# Patient Record
Sex: FEMALE | Race: WHITE | NOT HISPANIC OR LATINO | URBAN - METROPOLITAN AREA
[De-identification: names, ages, dates, MRNs, and addresses within clinical notes are randomized per-mention and may not be internally consistent; named-entity substitution may affect disease eponyms.]

---

## 2023-04-19 LAB — HBA1C MFR BLD: 5.3 % (ref 4–6)

## 2023-06-13 PROBLEM — E66.811 CLASS 1 OBESITY DUE TO EXCESS CALORIES WITH SERIOUS COMORBIDITY AND BODY MASS INDEX (BMI) OF 32.0 TO 32.9 IN ADULT: Chronic | Status: ACTIVE | Noted: 2023-06-13

## 2023-09-05 LAB
HUMAN PAPILLOMAVIRUS (HPV): NORMAL
PAP RECOMMENDATION EXT: NORMAL
PAP SMEAR: NORMAL

## 2024-07-18 ENCOUNTER — TELEPHONE (OUTPATIENT)
Dept: INTERNAL MEDICINE | Facility: CLINIC | Age: 54
End: 2024-07-18
Payer: COMMERCIAL

## 2024-07-18 DIAGNOSIS — N28.89 OTHER SPECIFIED DISORDERS OF KIDNEY AND URETER: Primary | ICD-10-CM

## 2024-07-18 NOTE — TELEPHONE ENCOUNTER
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 Insurance will cover a CT Abd W/WO contrast. Okay to order CT abd W/WO Contrast no pelvis.  ----- Message -----  From: Nicol Snow  Sent: 7/18/2024  10:53 AM CDT  To: Aleks Foster Staff  Subject: add'l clinicals needed                           [employer] is requesting additional clinicals for ct abdomen/pelvis. They are stating that they would approve cpt code [id number] ct abdomen with and without contrast. If doctor wants this study, please fax new order and let us know so we can [facility]. Request has been scanned into media manager.    Thanks,    Nicol nSow  [facility]

## 2024-07-24 ENCOUNTER — TELEPHONE (OUTPATIENT)
Dept: INTERNAL MEDICINE | Facility: CLINIC | Age: 54
End: 2024-07-24
Payer: COMMERCIAL

## 2024-07-24 DIAGNOSIS — R91.1 SOLITARY PULMONARY NODULE: Primary | ICD-10-CM

## 2024-07-24 NOTE — TELEPHONE ENCOUNTER
----- Message from Cristian Lizama MD sent at 7/24/2024  9:03 AM CDT -----  Patient needs dedicated CT of the chest WO - 4mm nodule noted in the right middle lobe  No concerning findings otherwise in the abdomen or pelvis

## 2024-08-29 ENCOUNTER — PATIENT OUTREACH (OUTPATIENT)
Facility: CLINIC | Age: 54
End: 2024-08-29
Payer: COMMERCIAL

## 2024-08-29 NOTE — PROGRESS NOTES
Population Health Outreach. Health Maintenance Topic(s) Outreach Outcomes & Actions Taken:    Breast Cancer Screening - Outreach Outcomes & Actions Taken  : External Records Requested & Care Team Updated if Applicable    Cervical Cancer Screening - Outreach Outcomes & Actions Taken  : External Records Uploaded & Care Team Updated if Applicable    Lab(s) - Outreach Outcomes & Actions Taken  : External Records Uploaded & Care Team Updated if Applicable and Primary Care Follow Up Visit Scheduled         Additional Notes:  Next PCP visit: 12/26/24    Hyperlinked PAP/HPV 09.04.23 Screening     [facility] OB [facility]YN ordered MMG@ 09/2023 appointment-  record request sent to [facility]     Hyperlinked 04.19.23 A1C          Care Management, Digital Medicine, and/or Education Referrals      Next Steps - Referral Actions: Digital Medicine Outcomes and Actions Taken: Pt Declined or Not Eligible

## 2024-08-29 NOTE — LETTER
AUTHORIZATION FOR RELEASE OF   CONFIDENTIAL INFORMATION    Dear RICKI,    We are seeing Monserrat Walker, date of birth 1970, in the clinic at [place] [address] INTERNAL MEDICINE. Cristian Lizama MD is the patient's PCP. Monserrat Walker has an outstanding lab/procedure at the time we reviewed her chart. In order to help keep her health information updated, she has authorized us to request the following medical record(s):        ( X )  MAMMOGRAM                                            Please fax records to [facility], [place], Tyler M, MD, [phone]       If you have any questions, please contact Mayra at [phone]          Patient Name: Monserrat Walker  : 1970  Patient Phone #: [phone]

## 2024-09-04 ENCOUNTER — OFFICE VISIT (OUTPATIENT)
Dept: INTERNAL MEDICINE | Facility: CLINIC | Age: 54
End: 2024-09-04
Payer: COMMERCIAL

## 2024-09-04 VITALS
TEMPERATURE: 98 F | SYSTOLIC BLOOD PRESSURE: 110 MMHG | RESPIRATION RATE: 16 BRPM | HEART RATE: 77 BPM | DIASTOLIC BLOOD PRESSURE: 80 MMHG | BODY MASS INDEX: 29.81 KG/M2 | OXYGEN SATURATION: 99 % | HEIGHT: 62 IN | WEIGHT: 162 LBS

## 2024-09-04 DIAGNOSIS — R05.8 PRODUCTIVE COUGH: Primary | ICD-10-CM

## 2024-09-04 DIAGNOSIS — J01.00 ACUTE NON-RECURRENT MAXILLARY SINUSITIS: ICD-10-CM

## 2024-09-04 PROCEDURE — 3079F DIAST BP 80-89 MM HG: CPT | Mod: CPTII,,, | Performed by: NURSE PRACTITIONER

## 2024-09-04 PROCEDURE — 3066F NEPHROPATHY DOC TX: CPT | Mod: CPTII,,, | Performed by: NURSE PRACTITIONER

## 2024-09-04 PROCEDURE — 1159F MED LIST DOCD IN RCRD: CPT | Mod: CPTII,,, | Performed by: NURSE PRACTITIONER

## 2024-09-04 PROCEDURE — 3061F NEG MICROALBUMINURIA REV: CPT | Mod: CPTII,,, | Performed by: NURSE PRACTITIONER

## 2024-09-04 PROCEDURE — 99214 OFFICE O/P EST MOD 30 MIN: CPT | Mod: ,,, | Performed by: NURSE PRACTITIONER

## 2024-09-04 PROCEDURE — 3074F SYST BP LT 130 MM HG: CPT | Mod: CPTII,,, | Performed by: NURSE PRACTITIONER

## 2024-09-04 PROCEDURE — 1160F RVW MEDS BY RX/DR IN RCRD: CPT | Mod: CPTII,,, | Performed by: NURSE PRACTITIONER

## 2024-09-04 PROCEDURE — 3008F BODY MASS INDEX DOCD: CPT | Mod: CPTII,,, | Performed by: NURSE PRACTITIONER

## 2024-09-04 RX ORDER — AMOXICILLIN AND CLAVULANATE POTASSIUM 875; 125 MG/1; MG/1
1 TABLET, FILM COATED ORAL EVERY 12 HOURS
Qty: 14 TABLET | Refills: 0 | Status: SHIPPED | OUTPATIENT
Start: 2024-09-04 | End: 2024-09-11

## 2024-09-04 RX ORDER — CETIRIZINE HYDROCHLORIDE 10 MG/1
10 TABLET ORAL DAILY
COMMUNITY

## 2024-09-04 RX ORDER — METHYLPREDNISOLONE 4 MG/1
TABLET ORAL
Qty: 21 EACH | Refills: 0 | Status: SHIPPED | OUTPATIENT
Start: 2024-09-04

## 2024-09-04 RX ORDER — ATENOLOL AND CHLORTHALIDONE TABLET 50; 25 MG/1; MG/1
1 TABLET ORAL DAILY
COMMUNITY

## 2024-09-04 NOTE — PROGRESS NOTES
Internal Medicine    Patient ID: [id number]     Chief Complaint: Cough (The past month), Nasal Congestion, and Headache      HPI:     Monserrat Walker is a 54 y.o. female here today for a problem visit. Complaining of chest congestion, nasal blockage, post nasal drip, productive cough, sinus and nasal congestion, and sore throat  x 1 month or so. Bilateral maxillary pressure. Tested positive for Covid about 2 months ago. Switched antihistamines without improvement of symptoms. Denies fever, body aches, chills, SOB, difficulty breathing, or CP. Admits to no known sick contacts.  Reports getting a celestone shot last week at work. No other complaints today.     Past Medical History:   Diagnosis Date    Attention deficit hyperactivity disorder (ADHD) 2023    Biliary dyskinesia 2023    Class 1 obesity due to excess calories with serious comorbidity and body mass index (BMI) of 32.0 to 32.9 in adult 2023    Generalized anxiety disorder 2023    Primary hypertension 2023    Raynaud disease 2023    Stage 3a chronic kidney disease 2023        Past Surgical History:   Procedure Laterality Date     SECTION      CHOLECYSTECTOMY          Social History     Tobacco Use    Smoking status: Never    Smokeless tobacco: Never   Substance and Sexual Activity    Alcohol use: Yes    Drug use: Never    Sexual activity: Yes        Current Outpatient Medications   Medication Instructions    amoxicillin-clavulanate 875-125mg (AUGMENTIN) 875-125 mg per tablet 1 tablet, Oral, Every 12 hours    atenoloL-chlorthalidone (TENORETIC) 50-25 mg Tab 1 tablet, Oral, Daily, Take 1/2 tablet    cetirizine (ZYRTEC) 10 mg, Oral, Daily    citalopram (CELEXA) 20 mg, Daily    famotidine (PEPCID) 40 mg, Oral, 2 times daily PRN    Lactobacillus rhamnosus GG (CULTURELLE) 10 billion cell capsule 1 capsule, Daily    levocetirizine (XYZAL) 5 mg, Nightly    methylPREDNISolone (MEDROL DOSEPACK) 4 mg tablet use as directed  "   norethindrone ac-eth estradioL (FEMHRT LOW DOSE) 0.5-2.5 mg-mcg per tablet 1 tablet, Daily    spironolactone (ALDACTONE) 50 mg, Daily       Review of patient's allergies indicates:   Allergen Reactions    Azithromycin Rash        Patient Care Team:  Cristian Lizama MD as PCP - General (Internal Medicine)  Ketan Srivastava MD as Consulting Physician (Gastroenterology)  Lory Jerry MD as Consulting Physician (Obstetrics and Gynecology)  Mayra Joya LPN as Care Coordinator  [facility] -     Subjective:     Review of Systems    12 point review of systems conducted, negative except as stated in the history of present illness. See HPI for details.    Objective:     Visit Vitals  /80 (BP Location: Left arm, Patient Position: Sitting, BP Method: Medium (Manual))   Pulse 77   Temp 97.7 °F (36.5 °C)   Resp 16   Ht 5' 2" (1.575 m)   Wt 73.5 kg (162 lb)   SpO2 99%   BMI 29.63 kg/m²       Physical Exam  Vitals and nursing note reviewed.   Constitutional:       General: She is not in acute distress.     Appearance: Normal appearance. She is not toxic-appearing.   HENT:      Head: Normocephalic.      Right Ear: A middle ear effusion is present. Tympanic membrane is bulging.      Left Ear: A middle ear effusion is present. Tympanic membrane is bulging.      Nose:      Right Turbinates: Enlarged.      Left Turbinates: Enlarged.      Right Sinus: Maxillary sinus tenderness present.      Left Sinus: Maxillary sinus tenderness present.      Mouth/Throat:      Mouth: Mucous membranes are moist.      Pharynx: Oropharynx is clear. No pharyngeal swelling.      Tonsils: No tonsillar exudate.   Eyes:      Extraocular Movements: Extraocular movements intact.      Pupils: Pupils are equal, round, and reactive to light.   Cardiovascular:      Rate and Rhythm: Normal rate and regular rhythm.      Pulses: Normal pulses.   Pulmonary:      Effort: Pulmonary effort is normal. No respiratory " distress.      Breath sounds: Normal breath sounds. No stridor or decreased air movement. No wheezing, rhonchi or rales.   Abdominal:      General: Abdomen is flat.      Palpations: Abdomen is soft.   Musculoskeletal:      Cervical back: Neck supple.   Lymphadenopathy:      Cervical: No cervical adenopathy.   Skin:     General: Skin is warm and dry.   Neurological:      General: No focal deficit present.      Mental Status: She is alert and oriented to person, place, and time.       Assessment:       ICD-10-CM ICD-9-CM   1. Productive cough  R05.8 786.2   2. Acute non-recurrent maxillary sinusitis  J01.00 461.0        Plan:     1. Productive cough  -     X-Ray Chest PA And Lateral; Future; Expected date: 09/04/2024    2. Acute non-recurrent maxillary sinusitis  Assessment & Plan:  Start Augmentin 875mg BID + Medrol Dose Pack + Flonase BID  Avoid Irritants and allergens.  Wash hands frequently to prevent common colds.  Drink plenty of fluids to thin mucous and/or use humidifier.    Consider NeilMed Saline Sinus Rinse BID.  Apply warm compress for sinus pain.  Use OTC decongestants as needed (HTN patients to avoid sudafed products).       Orders:  -     amoxicillin-clavulanate 875-125mg (AUGMENTIN) 875-125 mg per tablet; Take 1 tablet by mouth every 12 (twelve) hours. for 7 days  Dispense: 14 tablet; Refill: 0  -     methylPREDNISolone (MEDROL DOSEPACK) 4 mg tablet; use as directed  Dispense: 21 each; Refill: 0        In addition to their scheduled follow up, the patient has also been instructed to follow up on as needed basis.     Future Appointments   Date Time Provider Department Center   12/26/2024 10:20 AM Cristian Lizama MD [facility] [id number]        SURAJ Gilman

## 2024-09-04 NOTE — ASSESSMENT & PLAN NOTE
Start Augmentin 875mg BID + Medrol Dose Pack + Flonase BID  Avoid Irritants and allergens.  Wash hands frequently to prevent common colds.  Drink plenty of fluids to thin mucous and/or use humidifier.    Consider NeilMed Saline Sinus Rinse BID.  Apply warm compress for sinus pain.  Use OTC decongestants as needed (HTN patients to avoid sudafed products).

## 2024-09-16 PROBLEM — Z00.00 ANNUAL PHYSICAL EXAM: Status: RESOLVED | Noted: 2024-06-17 | Resolved: 2024-09-16

## 2024-10-09 LAB — BCS RECOMMENDATION EXT: NORMAL

## 2024-10-16 ENCOUNTER — DOCUMENTATION ONLY (OUTPATIENT)
Dept: INTERNAL MEDICINE | Facility: CLINIC | Age: 54
End: 2024-10-16
Payer: COMMERCIAL

## 2024-12-12 ENCOUNTER — TELEPHONE (OUTPATIENT)
Dept: INTERNAL MEDICINE | Facility: CLINIC | Age: 54
End: 2024-12-12
Payer: COMMERCIAL

## 2024-12-12 DIAGNOSIS — N18.31 STAGE 3A CHRONIC KIDNEY DISEASE: ICD-10-CM

## 2024-12-12 DIAGNOSIS — I10 PRIMARY HYPERTENSION: Primary | ICD-10-CM

## 2024-12-12 NOTE — TELEPHONE ENCOUNTER
----- Message from Med Assistant Arauz sent at 12/12/2024  9:50 AM CST -----  Regarding: CORBY 12/26/24 @ 10:20 Dr. Green  1. Are there any outstanding tasks in the patient's chart? Yes, fasting labs    2. Is there any documentation in the chart? No    3.Has patient been seen in an ER, Urgent care clinic, or been admitted since last visit?  If yes, When, where, and why    4. Has patient seen any other healthcare providers since last visit?  If yes, when, where, and why    5. Has patient had any bloodwork or XR done since last visit?    6. Is patient signed up for patient portal?    7. Does patient have home blood pressure cuff?   If yes, please have patient bring to appointment for validation.

## 2024-12-24 LAB
CHOLEST SERPL-MSCNC: 205 MG/DL (ref 0–200)
HDLC SERPL-MCNC: 81 MG/DL (ref 35–70)
LDLC SERPL CALC-MCNC: 108 MG/DL (ref 0–160)
TRIGL SERPL-MCNC: 81 MG/DL (ref 40–160)

## 2024-12-26 ENCOUNTER — PATIENT OUTREACH (OUTPATIENT)
Facility: CLINIC | Age: 54
End: 2024-12-26
Payer: COMMERCIAL

## 2024-12-26 ENCOUNTER — OFFICE VISIT (OUTPATIENT)
Dept: INTERNAL MEDICINE | Facility: CLINIC | Age: 54
End: 2024-12-26
Payer: COMMERCIAL

## 2024-12-26 VITALS
TEMPERATURE: 97 F | SYSTOLIC BLOOD PRESSURE: 124 MMHG | WEIGHT: 154 LBS | OXYGEN SATURATION: 99 % | DIASTOLIC BLOOD PRESSURE: 80 MMHG | BODY MASS INDEX: 28.34 KG/M2 | HEART RATE: 83 BPM | RESPIRATION RATE: 16 BRPM | HEIGHT: 62 IN

## 2024-12-26 DIAGNOSIS — H83.2X2 VESTIBULAR DISEQUILIBRIUM, LEFT: Primary | ICD-10-CM

## 2024-12-26 PROBLEM — E66.9 OBESITY (BMI 30-39.9): Status: RESOLVED | Noted: 2023-06-13 | Resolved: 2024-12-26

## 2024-12-26 PROCEDURE — 1160F RVW MEDS BY RX/DR IN RCRD: CPT | Mod: CPTII,,, | Performed by: INTERNAL MEDICINE

## 2024-12-26 PROCEDURE — 99214 OFFICE O/P EST MOD 30 MIN: CPT | Mod: ,,, | Performed by: INTERNAL MEDICINE

## 2024-12-26 PROCEDURE — 3066F NEPHROPATHY DOC TX: CPT | Mod: CPTII,,, | Performed by: INTERNAL MEDICINE

## 2024-12-26 PROCEDURE — 1159F MED LIST DOCD IN RCRD: CPT | Mod: CPTII,,, | Performed by: INTERNAL MEDICINE

## 2024-12-26 PROCEDURE — 3074F SYST BP LT 130 MM HG: CPT | Mod: CPTII,,, | Performed by: INTERNAL MEDICINE

## 2024-12-26 PROCEDURE — 3008F BODY MASS INDEX DOCD: CPT | Mod: CPTII,,, | Performed by: INTERNAL MEDICINE

## 2024-12-26 PROCEDURE — 3061F NEG MICROALBUMINURIA REV: CPT | Mod: CPTII,,, | Performed by: INTERNAL MEDICINE

## 2024-12-26 PROCEDURE — 3079F DIAST BP 80-89 MM HG: CPT | Mod: CPTII,,, | Performed by: INTERNAL MEDICINE

## 2024-12-26 RX ORDER — MAGNESIUM GLYCINATE 100 MG
200 TABLET ORAL DAILY
COMMUNITY

## 2024-12-26 RX ORDER — DIAZEPAM 5 MG/1
2.5 TABLET ORAL EVERY 6 HOURS PRN
Qty: 24 TABLET | Refills: 0 | Status: SHIPPED | OUTPATIENT
Start: 2024-12-26 | End: 2025-01-25

## 2024-12-26 NOTE — PROGRESS NOTES
Internal Medicine    Patient ID: [id number]     Chief Complaint: Hypertension (6 month f/u), Chronic Kidney Disease (6 month f/u), and Dizziness (Pt notes when waking up on 24 she felt dizzy and it has not gone away)      HPI:     Monserrat Walker is a 54 y.o. female here today for a follow up.   Here today with complaints of vestibular symptoms that have been present for 2 days she stated she got a new pillow she woke up and stretched and immediately felt dizzy.  She has been dizzy ever since that is been 2 days ago.  She has lost 20 lb in his off of her blood pressure medication, blood pressure looks great,  she has no hearing deficit  Past Medical History:   Diagnosis Date    Attention deficit hyperactivity disorder (ADHD) 2023    Biliary dyskinesia 2023    Class 1 obesity due to excess calories with serious comorbidity and body mass index (BMI) of 32.0 to 32.9 in adult 2023    Generalized anxiety disorder 2023    Primary hypertension 2023    Raynaud disease 2023    Stage 3a chronic kidney disease 2023        Past Surgical History:   Procedure Laterality Date     SECTION      CHOLECYSTECTOMY          Social History     Tobacco Use    Smoking status: Never    Smokeless tobacco: Never   Substance and Sexual Activity    Alcohol use: Yes    Drug use: Never    Sexual activity: Yes        Current Outpatient Medications   Medication Instructions    cetirizine (ZYRTEC) 10 mg, Daily    diazePAM (VALIUM) 2.5 mg, Oral, Every 6 hours PRN    famotidine (PEPCID) 40 mg, 2 times daily PRN    MAG GLYCINATE 200 mg, Daily    SEMAGLUTIDE SUBQ Inject into the skin.       Review of patient's allergies indicates:   Allergen Reactions    Azithromycin Rash        Patient Care Team:  Cristian Lizama MD as PCP - General (Internal Medicine)  Ketan Srivastava MD as Consulting Physician (Gastroenterology)  Lory Jerry MD as Consulting Physician (Obstetrics and  "Gynecology)  Mayra Joya LPN as Care Coordinator  [facility] -     Subjective:     Review of Systems    12 point review of systems conducted, negative except as stated in the history of present illness. See HPI for details.    Objective:     Visit Vitals  /80 (BP Location: Left arm, Patient Position: Sitting)   Pulse 83   Temp 97.2 °F (36.2 °C) (Temporal)   Resp 16   Ht 5' 2" (1.575 m)   Wt 69.9 kg (154 lb)   SpO2 99%   BMI 28.17 kg/m²       Physical Exam  Constitutional:       Appearance: Normal appearance.   HENT:      Head: Normocephalic and atraumatic.      Right Ear: Tympanic membrane and ear canal normal.      Left Ear: Tympanic membrane and ear canal normal.   Eyes:      Extraocular Movements: Extraocular movements intact.      Pupils: Pupils are equal, round, and reactive to light.   Cardiovascular:      Rate and Rhythm: Normal rate and regular rhythm.   Pulmonary:      Effort: Pulmonary effort is normal.      Breath sounds: Normal breath sounds.   Skin:     General: Skin is warm and dry.   Neurological:      General: No focal deficit present.      Mental Status: She is alert.   Psychiatric:         Mood and Affect: Mood normal.         Labs Reviewed:     Chemistry:  Lab Results   Component Value Date     07/09/2024    K 4.7 07/09/2024    BUN 10.1 07/09/2024    CREATININE 1.02 07/09/2024    EGFRNORACEVR >60 07/09/2024    GLUCOSE 81 07/09/2024    CALCIUM 9.6 07/09/2024    ALKPHOS 64 01/09/2020    LABPROT 7.2 01/09/2020    ALBUMIN 4.20 01/09/2020    BILIDIR 0.10 01/09/2020    IBILI 0.80 01/09/2020    AST 18 01/09/2020    ALT 25 01/09/2020    QODIWDJB15IS 32.62 12/06/2018    TSH 1.430 01/09/2020        Lab Results   Component Value Date    HGBA1C 5.3 04/19/2023        Hematology:  Lab Results   Component Value Date    WBC 7.0 01/09/2020    HGB 15.9 01/09/2020    HCT 47.7 (H) 01/09/2020     01/09/2020       Lipid Panel:  Lab Results   Component Value Date    " CHOL 209 (A) 06/12/2024    HDL 79 (A) 06/12/2024    LDL 82 01/12/2021    TRIG 98 (A) 06/12/2024    TOTALCHOLEST 2.6 01/09/2020        Urine:  Lab Results   Component Value Date    APPEARANCEUA Clear 07/09/2024    SGUA 1.009 07/09/2024    PROTEINUA Negative 07/09/2024    KETONESUA Negative 07/09/2024    LEUKOCYTESUR 25 (A) 07/09/2024    RBCUA 0-5 07/09/2024    WBCUA 0-5 07/09/2024    BACTERIA None Seen 07/09/2024    SQEPUA Trace 07/09/2024    CREATRANDUR 68.4 07/09/2024        Assessment:       ICD-10-CM ICD-9-CM   1. Vestibular disequilibrium, left  H83.2X2 386.50        Plan:     1. Vestibular disequilibrium, left  Assessment & Plan:  Referral to [facility] for vestibular eval  PRN valium  RTC PRN      Orders:  -     Ambulatory referral/consult to ENT; Future; Expected date: 01/02/2025  -     diazePAM (VALIUM) 5 MG tablet; Take 0.5 tablets (2.5 mg total) by mouth every 6 (six) hours as needed (dizziness).  Dispense: 24 tablet; Refill: 0  -     Ambulatory Referral/Consult to Physical Therapy; Future; Expected date: 01/02/2025         Follow up in about 6 months (around 6/26/2025) for NURSE PRACTITIONER, WELLNESS, with labs prior to visit. In addition to their scheduled follow up, the patient has also been instructed to follow up on as needed basis.     No future appointments.     Cristian Lizama MD

## 2024-12-26 NOTE — PROGRESS NOTES
Health Maintenance Topic(s) Outreach Outcomes & Actions Taken:    Lab(s) - Outreach Outcomes & Actions Taken  : External Records Uploaded & Care Team Updated if Applicable       Additional Notes:  Lipid 12/24/2024

## 2025-06-12 ENCOUNTER — TELEPHONE (OUTPATIENT)
Dept: INTERNAL MEDICINE | Facility: CLINIC | Age: 55
End: 2025-06-12
Payer: COMMERCIAL

## 2025-06-12 DIAGNOSIS — E55.9 VITAMIN D DEFICIENCY: ICD-10-CM

## 2025-06-12 DIAGNOSIS — N18.31 STAGE 3A CHRONIC KIDNEY DISEASE: ICD-10-CM

## 2025-06-12 DIAGNOSIS — Z00.00 WELLNESS EXAMINATION: Primary | ICD-10-CM

## 2025-06-12 DIAGNOSIS — Z13.29 SCREENING FOR HYPOTHYROIDISM: ICD-10-CM

## 2025-06-12 NOTE — TELEPHONE ENCOUNTER
----- Message from Irvin Arauz sent at 6/12/2025 12:15 PM CDT -----  Regarding: PV 6/26/25 @ 8:00 SURAJ Mcgovern  1. Are there any outstanding tasks in the patient's chart? Yes, Fasting Labs + EKG2. Does patient have home blood pressure cuff?  [ ] Yes  /   [ ] No(If yes, please have patient bring to appointment for validation.)3. Remind patient to bring in a list of medications or bottles of all medications including: A. All Prescription MedicationsB. Over-the-Counter Supplements and/or VitaminsC. Drops (ear and/or eye)D. Topical Creams

## 2025-06-23 ENCOUNTER — LAB VISIT (OUTPATIENT)
Dept: LAB | Facility: HOSPITAL | Age: 55
End: 2025-06-23
Attending: INTERNAL MEDICINE
Payer: COMMERCIAL

## 2025-06-23 DIAGNOSIS — Z00.00 WELLNESS EXAMINATION: ICD-10-CM

## 2025-06-23 DIAGNOSIS — N18.31 STAGE 3A CHRONIC KIDNEY DISEASE: ICD-10-CM

## 2025-06-23 DIAGNOSIS — E55.9 VITAMIN D DEFICIENCY: ICD-10-CM

## 2025-06-23 DIAGNOSIS — Z13.29 SCREENING FOR HYPOTHYROIDISM: ICD-10-CM

## 2025-06-23 LAB
25(OH)D3+25(OH)D2 SERPL-MCNC: 30 NG/ML (ref 30–80)
ALBUMIN SERPL-MCNC: 3.8 G/DL (ref 3.5–5)
ALBUMIN/CREAT UR: 4.7 MG/GM CR (ref 0–30)
ALBUMIN/GLOB SERPL: 1.4 RATIO (ref 1.1–2)
ALP SERPL-CCNC: 95 UNIT/L (ref 40–150)
ALT SERPL-CCNC: 11 UNIT/L (ref 0–55)
ANION GAP SERPL CALC-SCNC: 5 MEQ/L
AST SERPL-CCNC: 14 UNIT/L (ref 11–45)
BACTERIA #/AREA URNS AUTO: ABNORMAL /HPF
BASOPHILS # BLD AUTO: 0.02 X10(3)/MCL
BASOPHILS NFR BLD AUTO: 0.4 %
BILIRUB SERPL-MCNC: 0.7 MG/DL
BILIRUB UR QL STRIP.AUTO: NEGATIVE
BUN SERPL-MCNC: 17.7 MG/DL (ref 9.8–20.1)
CALCIUM SERPL-MCNC: 9.1 MG/DL (ref 8.4–10.2)
CHLORIDE SERPL-SCNC: 107 MMOL/L (ref 98–107)
CHOLEST SERPL-MCNC: 186 MG/DL
CHOLEST/HDLC SERPL: 3 {RATIO} (ref 0–5)
CLARITY UR: CLEAR
CO2 SERPL-SCNC: 28 MMOL/L (ref 22–29)
COLOR UR AUTO: ABNORMAL
CREAT SERPL-MCNC: 0.98 MG/DL (ref 0.55–1.02)
CREAT UR-MCNC: 190.1 MG/DL (ref 45–106)
CREAT/UREA NIT SERPL: 18
EOSINOPHIL # BLD AUTO: 0 X10(3)/MCL (ref 0–0.9)
EOSINOPHIL NFR BLD AUTO: 0 %
EPI CELLS #/AREA URNS HPF: ABNORMAL /HPF
ERYTHROCYTE [DISTWIDTH] IN BLOOD BY AUTOMATED COUNT: 13 % (ref 11.5–17)
EST. AVERAGE GLUCOSE BLD GHB EST-MCNC: 91.1 MG/DL
GFR SERPLBLD CREATININE-BSD FMLA CKD-EPI: >60 ML/MIN/1.73/M2
GLOBULIN SER-MCNC: 2.7 GM/DL (ref 2.4–3.5)
GLUCOSE SERPL-MCNC: 80 MG/DL (ref 74–100)
GLUCOSE UR QL STRIP: NORMAL
HBA1C MFR BLD: 4.8 %
HCT VFR BLD AUTO: 41.5 % (ref 37–47)
HDLC SERPL-MCNC: 63 MG/DL (ref 35–60)
HGB BLD-MCNC: 13.9 G/DL (ref 12–16)
HGB UR QL STRIP: NEGATIVE
IMM GRANULOCYTES # BLD AUTO: 0.01 X10(3)/MCL (ref 0–0.04)
IMM GRANULOCYTES NFR BLD AUTO: 0.2 %
KETONES UR QL STRIP: NEGATIVE
LDLC SERPL CALC-MCNC: 112 MG/DL (ref 50–140)
LEUKOCYTE ESTERASE UR QL STRIP: 250
LYMPHOCYTES # BLD AUTO: 1.77 X10(3)/MCL (ref 0.6–4.6)
LYMPHOCYTES NFR BLD AUTO: 36.1 %
MCH RBC QN AUTO: 29.3 PG (ref 27–31)
MCHC RBC AUTO-ENTMCNC: 33.5 G/DL (ref 33–36)
MCV RBC AUTO: 87.4 FL (ref 80–94)
MICROALBUMIN UR-MCNC: 9 UG/ML
MONOCYTES # BLD AUTO: 0.4 X10(3)/MCL (ref 0.1–1.3)
MONOCYTES NFR BLD AUTO: 8.2 %
MUCOUS THREADS URNS QL MICRO: ABNORMAL /LPF
NEUTROPHILS # BLD AUTO: 2.7 X10(3)/MCL (ref 2.1–9.2)
NEUTROPHILS NFR BLD AUTO: 55.1 %
NITRITE UR QL STRIP: NEGATIVE
NRBC BLD AUTO-RTO: 0 %
OHS QRS DURATION: 82 MS
OHS QTC CALCULATION: 421 MS
PH UR STRIP: 6 [PH]
PLATELET # BLD AUTO: 160 X10(3)/MCL (ref 130–400)
PMV BLD AUTO: 10.3 FL (ref 7.4–10.4)
POTASSIUM SERPL-SCNC: 4.2 MMOL/L (ref 3.5–5.1)
PROT SERPL-MCNC: 6.5 GM/DL (ref 6.4–8.3)
PROT UR QL STRIP: NEGATIVE
RBC # BLD AUTO: 4.75 X10(6)/MCL (ref 4.2–5.4)
RBC #/AREA URNS AUTO: ABNORMAL /HPF
SODIUM SERPL-SCNC: 140 MMOL/L (ref 136–145)
SP GR UR STRIP.AUTO: 1.02 (ref 1–1.03)
SQUAMOUS #/AREA URNS LPF: ABNORMAL /HPF
TRIGL SERPL-MCNC: 57 MG/DL (ref 37–140)
TSH SERPL-ACNC: 2.18 UIU/ML (ref 0.35–4.94)
UROBILINOGEN UR STRIP-ACNC: NORMAL
VLDLC SERPL CALC-MCNC: 11 MG/DL
WBC # BLD AUTO: 4.9 X10(3)/MCL (ref 4.5–11.5)
WBC #/AREA URNS AUTO: ABNORMAL /HPF

## 2025-06-23 PROCEDURE — 82043 UR ALBUMIN QUANTITATIVE: CPT

## 2025-06-23 PROCEDURE — 80053 COMPREHEN METABOLIC PANEL: CPT

## 2025-06-23 PROCEDURE — 85025 COMPLETE CBC W/AUTO DIFF WBC: CPT

## 2025-06-23 PROCEDURE — 84443 ASSAY THYROID STIM HORMONE: CPT

## 2025-06-23 PROCEDURE — 80061 LIPID PANEL: CPT

## 2025-06-23 PROCEDURE — 93010 ELECTROCARDIOGRAM REPORT: CPT | Mod: ,,, | Performed by: INTERNAL MEDICINE

## 2025-06-23 PROCEDURE — 82306 VITAMIN D 25 HYDROXY: CPT

## 2025-06-23 PROCEDURE — 81015 MICROSCOPIC EXAM OF URINE: CPT

## 2025-06-23 PROCEDURE — 93005 ELECTROCARDIOGRAM TRACING: CPT

## 2025-06-23 PROCEDURE — 36415 COLL VENOUS BLD VENIPUNCTURE: CPT

## 2025-06-23 PROCEDURE — 83036 HEMOGLOBIN GLYCOSYLATED A1C: CPT

## 2025-06-26 ENCOUNTER — OFFICE VISIT (OUTPATIENT)
Dept: INTERNAL MEDICINE | Facility: CLINIC | Age: 55
End: 2025-06-26
Payer: COMMERCIAL

## 2025-06-26 VITALS
SYSTOLIC BLOOD PRESSURE: 122 MMHG | WEIGHT: 146 LBS | HEIGHT: 62 IN | DIASTOLIC BLOOD PRESSURE: 80 MMHG | TEMPERATURE: 98 F | OXYGEN SATURATION: 99 % | BODY MASS INDEX: 26.87 KG/M2 | HEART RATE: 60 BPM

## 2025-06-26 DIAGNOSIS — R22.1 NECK SWELLING: ICD-10-CM

## 2025-06-26 DIAGNOSIS — Z00.00 WELLNESS EXAMINATION: Primary | ICD-10-CM

## 2025-06-26 DIAGNOSIS — F41.1 GENERALIZED ANXIETY DISORDER: Chronic | ICD-10-CM

## 2025-06-26 PROBLEM — N18.31 STAGE 3A CHRONIC KIDNEY DISEASE: Chronic | Status: RESOLVED | Noted: 2023-06-13 | Resolved: 2025-06-26

## 2025-06-26 PROBLEM — H83.2X2: Status: RESOLVED | Noted: 2024-12-26 | Resolved: 2025-06-26

## 2025-06-26 PROBLEM — N18.9 CHRONIC KIDNEY DISEASE: Status: RESOLVED | Noted: 2024-06-17 | Resolved: 2025-06-26

## 2025-06-26 PROBLEM — J01.00 ACUTE NON-RECURRENT MAXILLARY SINUSITIS: Status: RESOLVED | Noted: 2024-09-04 | Resolved: 2025-06-26

## 2025-06-26 PROBLEM — K82.8 BILIARY DYSKINESIA: Chronic | Status: RESOLVED | Noted: 2023-06-13 | Resolved: 2025-06-26

## 2025-06-26 RX ORDER — ESTRADIOL 0.1 MG/G
1 CREAM VAGINAL
COMMUNITY
Start: 2025-03-19

## 2025-06-26 NOTE — ASSESSMENT & PLAN NOTE
Age appropriate screenings up to date.   Reviewed immunizations, declines additional vaccines at this time.

## 2025-06-26 NOTE — PROGRESS NOTES
Internal Medicine    Patient ID: [id number]     Chief Complaint: Annual Exam      HPI:     Monserrat Walker is a 54 y.o. female here today for an annual wellness visit.     Reviewed and discussed lab results. Overall doing well.     Reports bilateral hip pain & left knee pain that occurs occasionally with movement. Has not tried stretching or anti-inflammatories.     Reports having an involuntary eye twitch to her right eye that began about 2 months ago then stopped. She reports that it recently started again. Denies other neurological changes or complaints.     Reports that she is on tirzepatide 2.5 mg weekly - managed by [facility]. Reports weight loss of 30 lbs.     Denies CP, SOB, or palps.     Testosterone pellets - Dr. Charles Jerry for GYN  Atif for ANNE Huang for cards - no longer seeing  had CTA and workup for renal artery stenosis back in 2022 with no findings      Health Maintenance         Date Due Completion Date    HIV Screening Never done ---    Pneumococcal Vaccines (Age 50+) (1 of 1 - PCV) Never done ---    COVID-19 Vaccine (3 - 2024-25 season) 09/04/2025 (Originally 9/1/2024) 9/24/2021    Influenza Vaccine (Season Ended) 09/01/2025 12/10/2018    TETANUS VACCINE 10/06/2025 10/6/2015    Mammogram 10/09/2025 10/9/2024    Annual UACr 06/23/2026 6/23/2025    Hemoglobin A1c (Diabetic Prevention Screening) 06/23/2028 6/23/2025    Cervical Cancer Screening 09/05/2028 10/29/2024    Colorectal Cancer Screening 02/27/2029 2/27/2024    Lipid Panel 06/23/2030 6/23/2025    RSV Vaccine (Age 60+ and Pregnant patients) (1 - 1-dose 75+ series) 08/11/2044 ---             Past Medical History:   Diagnosis Date    Attention deficit hyperactivity disorder (ADHD) 6/13/2023    Biliary dyskinesia 6/13/2023    Class 1 obesity due to excess calories with serious comorbidity and body mass index (BMI) of 32.0 to 32.9 in adult 6/13/2023    Generalized anxiety disorder 6/13/2023    Primary hypertension 6/13/2023     "Raynaud disease 2023    Stage 3a chronic kidney disease 2023        Past Surgical History:   Procedure Laterality Date     SECTION      CHOLECYSTECTOMY          Social History     Tobacco Use    Smoking status: Never    Smokeless tobacco: Never   Substance and Sexual Activity    Alcohol use: Yes    Drug use: Never    Sexual activity: Yes        Current Outpatient Medications   Medication Instructions    cetirizine (ZYRTEC) 10 mg, Daily    estradioL (ESTRACE) 1 g, Twice weekly    famotidine (PEPCID) 40 mg, 2 times daily PRN    MAG GLYCINATE 200 mg, Daily    TIRZEPATIDE SUBQ 30 Units, Every 7 days       Review of patient's allergies indicates:   Allergen Reactions    Azithromycin Rash        Patient Care Team:  Cristian Lizama MD as PCP - General (Internal Medicine)  Ketan Srivastava MD as Consulting Physician (Gastroenterology)  Lory Jerry MD as Consulting Physician (Obstetrics and Gynecology)  Mayra Joya LPN as Care Coordinator  [facility] -     Subjective:     Review of Systems    12 point review of systems conducted, negative except as stated in the history of present illness. See HPI for details.    Objective:     Visit Vitals  /80   Pulse 60   Temp 97.7 °F (36.5 °C) (Temporal)   Ht 5' 2" (1.575 m)   Wt 66.2 kg (146 lb)   SpO2 99%   BMI 26.70 kg/m²          Physical Exam  Vitals and nursing note reviewed.   Constitutional:       General: She is not in acute distress.     Appearance: She is not ill-appearing.   HENT:      Head: Normocephalic and atraumatic.      Mouth/Throat:      Mouth: Mucous membranes are moist.      Pharynx: Oropharynx is clear.   Eyes:      General: No scleral icterus.     Extraocular Movements: Extraocular movements intact.      Conjunctiva/sclera: Conjunctivae normal.      Pupils: Pupils are equal, round, and reactive to light.   Neck:      Vascular: No carotid bruit.      Comments: Mild swelling noted to left side of " thyroid - no palpable nodule   Cardiovascular:      Rate and Rhythm: Normal rate and regular rhythm.      Heart sounds: No murmur heard.     No friction rub. No gallop.   Pulmonary:      Effort: Pulmonary effort is normal. No respiratory distress.      Breath sounds: Normal breath sounds. No wheezing, rhonchi or rales.   Abdominal:      General: Abdomen is flat. Bowel sounds are normal. There is no distension.      Palpations: Abdomen is soft. There is no mass.      Tenderness: There is no abdominal tenderness.   Musculoskeletal:         General: Normal range of motion.      Cervical back: Normal range of motion and neck supple.   Skin:     General: Skin is warm and dry.   Neurological:      General: No focal deficit present.      Mental Status: She is alert.   Psychiatric:         Mood and Affect: Mood normal.         Labs Reviewed:     Chemistry:  Lab Results   Component Value Date     06/23/2025    K 4.2 06/23/2025    BUN 17.7 06/23/2025    CREATININE 0.98 06/23/2025    EGFRNORACEVR >60 06/23/2025    CALCIUM 9.1 06/23/2025    ALKPHOS 95 06/23/2025    ALBUMIN 3.8 06/23/2025    BILIDIR 0.10 01/09/2020    IBILI 0.80 01/09/2020    AST 14 06/23/2025    ALT 11 06/23/2025    MNZIFXOM84KI 30 06/23/2025    TSH 2.182 06/23/2025        Lab Results   Component Value Date    HGBA1C 4.8 06/23/2025        Hematology:  Lab Results   Component Value Date    WBC 4.90 06/23/2025    HGB 13.9 06/23/2025    HCT 41.5 06/23/2025     06/23/2025       Lipid Panel:  Lab Results   Component Value Date    CHOL 186 06/23/2025    HDL 63 (H) 06/23/2025    .00 06/23/2025    TRIG 57 06/23/2025    TOTALCHOLEST 3 06/23/2025        Urine:  Lab Results   Component Value Date    APPEARANCEUA Clear 06/23/2025    SGUA 1.023 06/23/2025    PROTEINUA Negative 06/23/2025    KETONESUA Negative 06/23/2025    LEUKOCYTESUR 250 (A) 06/23/2025    RBCUA 0-5 06/23/2025    WBCUA 6-10 (A) 06/23/2025    BACTERIA None Seen 06/23/2025    SQEPUA Trace  06/23/2025    ELSIR 190.1 (H) 06/23/2025        Assessment:       ICD-10-CM ICD-9-CM   1. Wellness examination  Z00.00 V70.0   2. Generalized anxiety disorder  F41.1 300.02   3. Neck swelling  R22.1 784.2        Plan:       1. Wellness examination  Overview:    Cervical Cancer Screening -  Last Pap on 10/2024. Hx of 1 abnormal PAP - 3 years ago - normal since then. Follow up with GYN Clinic for Pap/Pelvic.      Breast Cancer Screening - Last Mammogram on 10/2024. Normal. Follow up in 1 year.     Colon Cancer Screening - Colonoscopy on 02/2024. Follow up in 5 years.    Eye Exam - Recommend annually. Dr. [facility]    Dental Exam - Recommend biannual exams.     Vaccinations -   Immunization History   Administered Date(s) Administered    COVID-19, MRNA, LN-S, PF (MODERNA FULL 0.5 ML DOSE) 08/27/2021, 09/24/2021    Influenza - Quadrivalent - PF *Preferred* (6 months and older) 11/12/2016, 11/12/2016    Influenza - Trivalent - Fluarix, Flulaval, Fluzone, Afluria - PF 10/18/2017, 12/10/2018    Tdap 10/06/2015    Zoster Recombinant 01/13/2020, 05/14/2020        Assessment & Plan:  Age appropriate screenings up to date.   Reviewed immunizations, declines additional vaccines at this time.       2. Generalized anxiety disorder  Assessment & Plan:  Stable. Not on any medications currently.   Educated patient on the risks of serotonin based medications such as serotonin modulators and SSRIs/SNRIs including common side effects of nausea, GI upset, headache dizziness as well as the rare risk for worsening symptoms of depression including development of suicidal thoughts or ideations, and serotonin syndrome.   Discussed benefits of medication not becoming noticeable until up to 6 weeks from start date.   Exercise daily. Get sunlight daily.  Practice positive phrases and repeat throughout the day, along with yoga and relaxation techniques.  Establish good social support, make changes to reduce stress.  Reports  any symptoms of suicidal/homicidal ideations or self harm immediately, if clinic is closed go to nearest emergency room.        3. Neck swelling  Assessment & Plan:  Mild swelling noted to left side of thyroid.  Obtain thyroid US.            Follow up in about 1 year (around 6/26/2026) for With MD, Wellness. In addition to their scheduled follow up, the patient has also been instructed to follow up on as needed basis.     Future Appointments   Date Time Provider Department Center   6/30/2026 11:20 AM Cristian Lizama MD [id number]        SURAJ Nickerson

## 2025-06-26 NOTE — ASSESSMENT & PLAN NOTE
Stable. Not on any medications currently.   Educated patient on the risks of serotonin based medications such as serotonin modulators and SSRIs/SNRIs including common side effects of nausea, GI upset, headache dizziness as well as the rare risk for worsening symptoms of depression including development of suicidal thoughts or ideations, and serotonin syndrome.   Discussed benefits of medication not becoming noticeable until up to 6 weeks from start date.   Exercise daily. Get sunlight daily.  Practice positive phrases and repeat throughout the day, along with yoga and relaxation techniques.  Establish good social support, make changes to reduce stress.  Reports any symptoms of suicidal/homicidal ideations or self harm immediately, if clinic is closed go to nearest emergency room.

## 2025-07-01 ENCOUNTER — HOSPITAL ENCOUNTER (OUTPATIENT)
Dept: RADIOLOGY | Facility: HOSPITAL | Age: 55
Discharge: HOME | End: 2025-07-01
Payer: COMMERCIAL

## 2025-07-01 ENCOUNTER — RESULTS FOLLOW-UP (OUTPATIENT)
Dept: INTERNAL MEDICINE | Facility: CLINIC | Age: 55
End: 2025-07-01

## 2025-07-01 DIAGNOSIS — R22.1 NECK SWELLING: ICD-10-CM

## 2025-07-01 PROCEDURE — 76536 US EXAM OF HEAD AND NECK: CPT | Mod: TC
